# Patient Record
Sex: MALE | Race: WHITE | NOT HISPANIC OR LATINO | Employment: FULL TIME | URBAN - METROPOLITAN AREA
[De-identification: names, ages, dates, MRNs, and addresses within clinical notes are randomized per-mention and may not be internally consistent; named-entity substitution may affect disease eponyms.]

---

## 2017-09-13 ENCOUNTER — ALLSCRIPTS OFFICE VISIT (OUTPATIENT)
Dept: OTHER | Facility: OTHER | Age: 37
End: 2017-09-13

## 2018-01-12 NOTE — PROGRESS NOTES
Assessment    1  Post-nasal drip (539 91) (R09 82)    Plan  Post-nasal drip    · Nasonex 50 MCG/ACT Nasal Suspension; USE 1 SPRAY IN EACH NOSTRIL  TWICE DAILY   · Promethazine-DM 6 25-15 MG/5ML Oral Syrup; take 1 teaspoonful every 4 to 6  hours as needed for cough    Discussion/Summary    Post Nasal drip- trial of nasonex and cough syrup  Pt advised to call in 1 week with an update  Possible side effects of new medications were reviewed with the patient/guardian today  The treatment plan was reviewed with the patient/guardian  The patient/guardian understands and agrees with the treatment plan      Chief Complaint  Patient is here for chest and head congestion and cough for approximately 1 week      History of Present Illness  Pt is here for a cough at night mostly present at night  Pt has also noticed chest congestion  Pt is not a smoker  No wheezing associated with it  Has some drainage associated with it  Associated with sinus pressure  Symptoms have been going for about 1 week  Review of Systems    Constitutional: No fever or chills, feels well, no tiredness, no recent weight gain or weight loss  Eyes: No complaints of eye pain, no red eyes, no discharge from eyes, no itchy eyes  ENT: as noted in HPI  Cardiovascular: No complaints of slow heart rate, no fast heart rate, no chest pain, no palpitations, no leg claudication, no lower extremity  Respiratory: cough, but as noted in HPI  Gastrointestinal: No complaints of abdominal pain, no constipation, no nausea or vomiting, no diarrhea or bloody stools  Musculoskeletal: No complaints of arthralgia, no myalgias, no joint swelling or stiffness, no limb pain or swelling  Integumentary: No complaints of skin rash or skin lesions, no itching, no skin wound, no dry skin  ROS reviewed  Active Problems    1  Asthma (604 90) (J45 909)   2  Lipid screening (Z07 91) (O88 225)    Past Medical History    1  Bronchitis (490) (J40)   2   History of Lumbar radiculopathy (724 4) (M54 16)    The active problems and past medical history were reviewed and updated today  Surgical History    1  History of Closed Treatment Of Clavicular Fracture   2  History of Tonsillectomy With Adenoidectomy   3  History of Tympanoplasty    Family History    1  Family history of Hypothyroidism   2  Family history of Osteoarthritis (V17 7)    3  Family history of Dyslipidemia   4  Family history of Echo Mitral Valve Systolic Prolapse   5  Family history of Hypertension (V17 49)   6  Family history of Migraine Headache    7  Family history of Asthma (V17 5)    8  Family history of Cancer    Social History    · Never A Smoker   · Never Drank Alcohol   · Uses Safety Equipment - Seatbelts    Current Meds   1  No Reported Medications Recorded    Allergies    1  Codeine Derivatives   2  Hydrocortisone CREA   3  Zithromax PACK    Physical Exam    Constitutional   General appearance: No acute distress, well appearing and well nourished  Eyes   Conjunctiva and lids: No swelling, erythema, or discharge  Ears, Nose, Mouth, and Throat   External inspection of ears and nose: Normal     Otoscopic examination: Tympanic membrance translucent with normal light reflex  Canals patent without erythema  erythematous, edematous, clear drainage  Oropharynx: Normal with no erythema, edema, exudate or lesions  Pulmonary   Respiratory effort: No increased work of breathing or signs of respiratory distress  Auscultation of lungs: Clear to auscultation, equal breath sounds bilaterally, no wheezes, no rales, no rhonci  Cardiovascular   Auscultation of heart: Normal rate and rhythm, normal S1 and S2, without murmurs  Musculoskeletal   Gait and station: Normal     Skin   Skin and subcutaneous tissue: Normal without rashes or lesions           Signatures   Electronically signed by : Sunny Martinez MD; Jan 20 2016  1:56PM EST                       (Author)

## 2018-01-13 NOTE — PROGRESS NOTES
Assessment   1  Conjunctivitis (372 30) (H10 9)  2  Cough (786 2) (R05)    Plan  Cough    · Start: Benzonatate 100 MG Oral Capsule; TAKE 1 CAPSULE 3 TIMES DAILY AS NEEDED   · Start: Ciprodex 0 3-0 1 % Otic Suspension; INSTILL 3 DROPS IN AFFECTED EAR(S)  TWICE DAILY  Need for diphtheria-tetanus-pertussis (Tdap) vaccine, adult/adolescent    · Temporarily Stop: Adacel 5-2-15 5 LF-MCG/0 5 Intramuscular Suspension    Discussion/Summary    Ethan  Is here because of conjunctivitis in both eyes  I prescribed Ciprodex for his eyes  Additionally he still has an ongoing cough, the cough I prescribed last week does not seem to be helping  I've given him a prescription for generic Tessalon Perles  He will contact the office if he is not improving  Chief Complaint  Patient here for eye irritation and discharge for several days  History of Present Illness  Dusty Jimenes is here last week for an upper respiratory infection  The day following his visit he did develop some irritation in both eyes  Right eye has improved left eye seems to be getting worse with increased discharge from the left eye which was purulent in nature  He has no history of foreign body in his eye or exposure to conjunctivitis  Active Problems   1  Asthma (493 90) (J45 909)  2  Lipid screening (V77 91) (Z13 220)  3  Need for diphtheria-tetanus-pertussis (Tdap) vaccine, adult/adolescent (V06 1) (Z23)  4  Need for influenza vaccination (V04 81) (Z23)  5  Need for pneumococcal vaccination (V03 82) (Z23)  6  Negative depression screening  7  Post-nasal drip (784 91) (R09 82)    Past Medical History   1  Bronchitis (490) (J40)  2  History of Lumbar radiculopathy (724 4) (M54 16)    Surgical History   1  History of Closed Treatment Of Clavicular Fracture  2  History of Tonsillectomy With Adenoidectomy  3  History of Tympanoplasty    The surgical history was reviewed and updated today  Family History   1  Family history of Hypothyroidism  2   Family history of Osteoarthritis (V17 7)   3  Family history of Dyslipidemia  4  Family history of Echo Mitral Valve Systolic Prolapse  5  Family history of Hypertension (V17 49)  6  Family history of Migraine Headache   7  Family history of Asthma (V17 5)   8  Family history of Cancer    The family history was reviewed and updated today  Social History    · Never A Smoker   · Never Drank Alcohol   · Uses Safety Equipment - Seatbelts  The social history was reviewed and is unchanged  Current Meds  1  No Reported Medications Recorded    Allergies   1  Codeine Derivatives  2  Hydrocortisone CREA  3  Zithromax PACK    Vitals  Vital Signs [Data Includes: Current Encounter]    Recorded: [id number] 10:33AM   Temperature 98 3 F   Heart Rate 68   Systolic 229   Diastolic 82   Height 5 ft 10 5 in   Weight 159 lb    BMI Calculated 22 49   BSA Calculated 1 91   O2 Saturation 98     Physical Exam    Constitutional   General appearance: No acute distress, well appearing and well nourished  Eyes   Conjunctiva and lids: Abnormal   Conjunctiva Findings: bilateral hyperemia and purulent discharge on the left  Eye Lids: normal bilaterally  Pupils and irises: Equal, round and reactive to light  Ears, Nose, Mouth, and Throat   External inspection of ears and nose: Normal     Otoscopic examination: Tympanic membrance translucent with normal light reflex  Canals patent without erythema  Nasal mucosa, septum, and turbinates: Normal without edema or erythema  Oropharynx: Normal with no erythema, edema, exudate or lesions  Pulmonary   Respiratory effort: No increased work of breathing or signs of respiratory distress  Auscultation of lungs: Clear to auscultation, equal breath sounds bilaterally, no wheezes, no rales, no rhonci  Cardiovascular   Palpation of heart: Normal PMI, no thrills  Auscultation of heart: Normal rate and rhythm, normal S1 and S2, without murmurs      Examination of extremities for edema and/or varicosities: Normal     Carotid pulses: Normal     Abdomen   Abdomen: Non-tender, no masses  Liver and spleen: No hepatomegaly or splenomegaly  Lymphatic   Palpation of lymph nodes in neck: No lymphadenopathy  Musculoskeletal   Gait and station: Normal     Digits and nails: Normal without clubbing or cyanosis  Inspection/palpation of joints, bones, and muscles: Normal     Skin   Skin and subcutaneous tissue: Normal without rashes or lesions  Neurologic   Cranial nerves: Cranial nerves 2-12 intact  Reflexes: 2+ and symmetric  Sensation: No sensory loss      Psychiatric   Orientation to person, place and time: Normal     Mood and affect: Normal          Signatures   Electronically signed by : CARINA Hernandez ; Jan 26 2016 11:02AM EST                       (Author)    Electronically signed by : CARINA Hernandez ; Jan 26 2016 11:02AM EST                       (Author)

## 2018-01-15 VITALS
HEIGHT: 71 IN | DIASTOLIC BLOOD PRESSURE: 84 MMHG | OXYGEN SATURATION: 97 % | HEART RATE: 78 BPM | SYSTOLIC BLOOD PRESSURE: 122 MMHG | WEIGHT: 164.13 LBS | TEMPERATURE: 98.5 F | BODY MASS INDEX: 22.98 KG/M2

## 2018-01-31 ENCOUNTER — OFFICE VISIT (OUTPATIENT)
Dept: FAMILY MEDICINE CLINIC | Facility: CLINIC | Age: 38
End: 2018-01-31
Payer: COMMERCIAL

## 2018-01-31 VITALS
DIASTOLIC BLOOD PRESSURE: 98 MMHG | SYSTOLIC BLOOD PRESSURE: 158 MMHG | BODY MASS INDEX: 21.56 KG/M2 | WEIGHT: 154 LBS | HEART RATE: 61 BPM | RESPIRATION RATE: 18 BRPM | HEIGHT: 71 IN | OXYGEN SATURATION: 97 % | TEMPERATURE: 97.5 F

## 2018-01-31 DIAGNOSIS — H66.91 OTITIS MEDIA OF RIGHT EAR WITH SPONTANEOUS RUPTURE OF TYMPANIC MEMBRANE: ICD-10-CM

## 2018-01-31 DIAGNOSIS — H72.91 OTITIS MEDIA OF RIGHT EAR WITH SPONTANEOUS RUPTURE OF TYMPANIC MEMBRANE: ICD-10-CM

## 2018-01-31 DIAGNOSIS — H92.01 OTALGIA OF RIGHT EAR: Primary | ICD-10-CM

## 2018-01-31 PROCEDURE — 99214 OFFICE O/P EST MOD 30 MIN: CPT | Performed by: NURSE PRACTITIONER

## 2018-01-31 PROCEDURE — 3008F BODY MASS INDEX DOCD: CPT | Performed by: NURSE PRACTITIONER

## 2018-01-31 RX ORDER — AMOXICILLIN 875 MG/1
875 TABLET, COATED ORAL 2 TIMES DAILY
Qty: 14 TABLET | Refills: 0 | Status: SHIPPED | OUTPATIENT
Start: 2018-01-31 | End: 2018-02-07

## 2018-01-31 NOTE — PATIENT INSTRUCTIONS
For pain: use Ibuprofen every 8 hours for pain  You may apply dry heat for comfort  Return if no improvement after 7 days  Otitis Media   WHAT YOU NEED TO KNOW:   Otitis media is an ear infection  DISCHARGE INSTRUCTIONS:   Medicines:  · Ibuprofen or acetaminophen  helps decrease your pain and fever  They are available without a doctor's order  Ask your healthcare provider which medicine is right for you  Ask how much to take and how often to take it  These medicines can cause stomach bleeding if not taken correctly  Ibuprofen can cause kidney damage  Do not take ibuprofen if you have kidney disease, an ulcer, or allergies to aspirin  Acetaminophen can cause liver damage  Do not drink alcohol if you take acetaminophen  · Ear drops  help treat your ear pain  · Antibiotics  help treat a bacterial infection that caused your ear infection  · Take your medicine as directed  Contact your healthcare provider if you think your medicine is not helping or if you have side effects  Tell him or her if you are allergic to any medicine  Keep a list of the medicines, vitamins, and herbs you take  Include the amounts, and when and why you take them  Bring the list or the pill bottles to follow-up visits  Carry your medicine list with you in case of an emergency  Heat or ice:   · Heat  may be used to decrease your pain  Place a warm, moist washcloth on your ear  Apply for 15 to 20 minutes, 3 to 4 times a day    · Ice  helps decrease swelling and pain  Use an ice pack or put crushed ice in a plastic bag  Cover the ice pack with a towel and place it on your ear for 15 to 20 minutes, 3 to 4 times a day for 2 days  Prevent otitis media:   · Wash your hands often  Use soap and water  Wash your hands after you use the bathroom, change a child's diapers, or sneeze  Wash your hands before you prepare or eat food  · Stay away from people who are ill  Some germs are easily and quickly spread through contact    Return to work or school: You may return to work or school when your fever is gone  Follow up with your healthcare provider as directed:  Write down your questions so you remember to ask them during your visits  Contact your healthcare provider if:   · Your ear pain gets worse or does not go away, even after treatment  · The outside of your ear is red or swollen  · You have vomiting or diarrhea  · You have fluid coming from your ear  · You have questions or concerns about your condition or care  Return to the emergency department if:   · You have a seizure  · You have a fever and a stiff neck  © 2017 [facility] Information is for End User's use only and may not be sold, redistributed or otherwise used for commercial purposes  All illustrations and images included in CareNotes® are the copyrighted property of A D A M [employer] Inc  or Chencho Pedroza  The above information is an  only  It is not intended as medical advice for individual conditions or treatments  Talk to your doctor, nurse or pharmacist before following any medical regimen to see if it is safe and effective for you

## 2018-01-31 NOTE — PROGRESS NOTES
Assessment/Plan:    No problem-specific Assessment & Plan notes found for this encounter  Diagnoses and all orders for this visit:    Otalgia of right ear    Otitis media of right ear with spontaneous rupture of tympanic membrane  -     amoxicillin (AMOXIL) 875 mg tablet; Take 1 tablet (875 mg total) by mouth 2 (two) times a day for 7 days          Subjective:      Patient ID: Cheryl Jovel is a 44 y o  male  Pt is here to discuss pain in his right ear  The pain started a few days ago  Pain was gradual and is now intense  The pain woke him up , the first night it occurred  He has been taking tylenol  Pain originates in front of ear and radiates down the neck and he thinks his face may be swollen on the right side  The following portions of the patient's history were reviewed and updated as appropriate:   He  has no past medical history on file  He  does not have any pertinent problems on file  He  has no past surgical history on file  His family history is not on file  He  reports that he has never smoked  He has never used smokeless tobacco  He reports that he drinks alcohol  He reports that he does not use drugs  Current Outpatient Prescriptions   Medication Sig Dispense Refill    amoxicillin (AMOXIL) 875 mg tablet Take 1 tablet (875 mg total) by mouth 2 (two) times a day for 7 days 14 tablet 0     No current facility-administered medications for this visit  No current outpatient prescriptions on file prior to visit  No current facility-administered medications on file prior to visit  He is allergic to azithromycin; codeine; and hydrocortisone       Review of Systems   HENT: Positive for ear pain  Negative for congestion, rhinorrhea, sinus pain, sinus pressure and sore throat  Eyes: Negative for discharge and redness  Respiratory: Negative for cough, shortness of breath and wheezing  Cardiovascular: Negative for chest pain     Gastrointestinal: Negative for abdominal pain    Skin: Negative for color change and rash  Allergic/Immunologic: Negative for immunocompromised state  Neurological: Negative for headaches  Hematological: Positive for adenopathy  Objective:     Physical Exam   Constitutional: He is oriented to person, place, and time  He appears well-developed and well-nourished  No distress  HENT:   Head: Normocephalic and atraumatic  Right Ear: External ear normal    Left Ear: External ear normal    Nose: Nose normal    Mouth/Throat: Oropharynx is clear and moist  No oropharyngeal exudate  Right side TM rupture   Eyes: Conjunctivae and EOM are normal  Pupils are equal, round, and reactive to light  Right eye exhibits no discharge  Left eye exhibits no discharge  Neck: Normal range of motion  Neck supple  Cardiovascular: Normal rate, regular rhythm and normal heart sounds  Exam reveals no gallop and no friction rub  No murmur heard  Pulmonary/Chest: Effort normal and breath sounds normal  No respiratory distress  He has no wheezes  Abdominal: Soft  Musculoskeletal: Normal range of motion  He exhibits no edema  Lymphadenopathy:     He has cervical adenopathy  Neurological: He is alert and oriented to person, place, and time  Skin: Skin is warm and dry  No rash noted  He is not diaphoretic  No erythema  Psychiatric: He has a normal mood and affect  His behavior is normal  Judgment and thought content normal    Nursing note and vitals reviewed

## 2019-07-01 ENCOUNTER — OFFICE VISIT (OUTPATIENT)
Dept: FAMILY MEDICINE CLINIC | Facility: CLINIC | Age: 39
End: 2019-07-01
Payer: COMMERCIAL

## 2019-07-01 VITALS
SYSTOLIC BLOOD PRESSURE: 120 MMHG | HEART RATE: 70 BPM | DIASTOLIC BLOOD PRESSURE: 74 MMHG | WEIGHT: 163.8 LBS | OXYGEN SATURATION: 98 % | HEIGHT: 71 IN | RESPIRATION RATE: 18 BRPM | TEMPERATURE: 98.9 F | BODY MASS INDEX: 22.93 KG/M2

## 2019-07-01 DIAGNOSIS — M54.9 BACK PAIN, UNSPECIFIED BACK LOCATION, UNSPECIFIED BACK PAIN LATERALITY, UNSPECIFIED CHRONICITY: Primary | ICD-10-CM

## 2019-07-01 LAB
SL AMB  POCT GLUCOSE, UA: NORMAL
SL AMB LEUKOCYTE ESTERASE,UA: NORMAL
SL AMB POCT BILIRUBIN,UA: NORMAL
SL AMB POCT BLOOD,UA: NORMAL
SL AMB POCT CLARITY,UA: CLEAR
SL AMB POCT COLOR,UA: YELLOW
SL AMB POCT KETONES,UA: NORMAL
SL AMB POCT NITRITE,UA: NORMAL
SL AMB POCT PH,UA: 5.5
SL AMB POCT SPECIFIC GRAVITY,UA: 1.02
SL AMB POCT URINE PROTEIN: NORMAL
SL AMB POCT UROBILINOGEN: 0.2

## 2019-07-01 PROCEDURE — 99213 OFFICE O/P EST LOW 20 MIN: CPT | Performed by: NURSE PRACTITIONER

## 2019-07-01 PROCEDURE — 3008F BODY MASS INDEX DOCD: CPT | Performed by: NURSE PRACTITIONER

## 2019-07-01 PROCEDURE — 81003 URINALYSIS AUTO W/O SCOPE: CPT | Performed by: NURSE PRACTITIONER

## 2019-07-01 PROCEDURE — 1036F TOBACCO NON-USER: CPT | Performed by: NURSE PRACTITIONER

## 2019-07-01 RX ORDER — CYCLOBENZAPRINE HCL 5 MG
5 TABLET ORAL 3 TIMES DAILY PRN
Qty: 42 TABLET | Refills: 0 | Status: SHIPPED | OUTPATIENT
Start: 2019-07-01 | End: 2019-07-15

## 2019-07-01 NOTE — PROGRESS NOTES
Assessment/Plan:    No problem-specific Assessment & Plan notes found for this encounter  Diagnoses and all orders for this visit:    Back pain, unspecified back location, unspecified back pain laterality, unspecified chronicity  Comments: Will start 5mg Flexeril, get cbc, cmp  Follow up in 2 weeks  Orders:  -     POCT urine dip auto non-scope  -     CBC and differential; Future  -     Comprehensive metabolic panel; Future  -     cyclobenzaprine (FLEXERIL) 5 mg tablet; Take 1 tablet (5 mg total) by mouth 3 (three) times a day as needed for muscle spasms for up to 14 days          Subjective:      Patient ID: Carl Najjar is a 35 y o  male  Pt here with complaints R sided back and flank pain  This began 3-4 days ago  There is no radiation of the pain  The pain is constant  Describes the pain as dull  There are no palliative factors  It is worse with prolonged movements  There are no temporal factors  There are no reported injuries  No tick bites  No changes in sensation in legs or weakness in legs  No dyuria, hematuria  No hx of stones  The patient describes associated fatigue  This has been ongoing for 2-3 weeks  The following portions of the patient's history were reviewed and updated as appropriate:   He  has a past medical history of Lumbar radiculopathy  He   Patient Active Problem List    Diagnosis Date Noted    Back pain 07/01/2019    Otalgia of right ear 01/31/2018    Otitis media of right ear with spontaneous rupture of tympanic membrane 01/31/2018    Asthma 06/12/2013     His family history includes Asthma in his sister; Cancer in his paternal grandfather; Hypertension in his father; Hypothyroidism in his mother; Migraines in his father; Mitral valve prolapse in his father; Osteoarthritis in his mother; Other in his father    Current Outpatient Medications   Medication Sig Dispense Refill    cyclobenzaprine (FLEXERIL) 5 mg tablet Take 1 tablet (5 mg total) by mouth 3 (three) times a day as needed for muscle spasms for up to 14 days 42 tablet 0     No current facility-administered medications for this visit  He is allergic to azithromycin; codeine; and hydrocortisone       Review of Systems   Constitutional: Positive for fatigue  Negative for activity change, appetite change, chills, diaphoresis, fever and unexpected weight change  HENT: Negative for congestion, ear pain, hearing loss, postnasal drip, sinus pressure, sinus pain, sneezing and sore throat  Eyes: Negative for pain, redness and visual disturbance  Respiratory: Negative for cough and shortness of breath  Cardiovascular: Negative for chest pain and leg swelling  Gastrointestinal: Negative for abdominal pain, diarrhea, nausea and vomiting  Endocrine: Negative  Genitourinary: Negative for dysuria, frequency, hematuria, scrotal swelling and testicular pain  Musculoskeletal: Positive for back pain  Negative for arthralgias, joint swelling and neck stiffness  Skin: Negative  Neurological: Negative for dizziness and light-headedness  Psychiatric/Behavioral: Negative for behavioral problems and dysphoric mood  Objective:      /74 (BP Location: Left arm, Patient Position: Sitting, Cuff Size: Adult)   Pulse 70   Temp 98 9 °F (37 2 °C) (Tympanic)   Resp 18   Ht 5' 10 5" (1 791 m)   Wt 74 3 kg (163 lb 12 8 oz)   SpO2 98%   BMI 23 17 kg/m²          Physical Exam   Constitutional: He is oriented to person, place, and time  Vital signs are normal  He appears well-developed and well-nourished  No distress  HENT:   Head: Normocephalic and atraumatic  Eyes: Pupils are equal, round, and reactive to light  Neck: Normal range of motion  No thyromegaly present  Cardiovascular: Normal rate, regular rhythm, normal heart sounds and intact distal pulses  No murmur heard  Pulmonary/Chest: Effort normal and breath sounds normal  No respiratory distress  He has no wheezes  Abdominal: Soft  Bowel sounds are normal  He exhibits no distension  There is no tenderness  Musculoskeletal: Normal range of motion  He exhibits no tenderness or deformity  Lumbar back: He exhibits spasm  He exhibits no tenderness and no deformity  Arms:  R sided back and flank pain  Worse with bending forward  No tenderness to palpation   Neurological: He is alert and oriented to person, place, and time  No sensory deficit  He exhibits normal muscle tone  Skin: Skin is warm and dry  Capillary refill takes less than 2 seconds  Psychiatric: He has a normal mood and affect  BMI Counseling: Body mass index is 23 17 kg/m²  Discussed the patient's BMI with him  The BMI is above average  BMI counseling and education was provided to the patient  Nutrition recommendations include 3-5 servings of fruits/vegetables daily  Exercise recommendations include exercising 3-5 times per week

## 2021-03-30 DIAGNOSIS — Z23 ENCOUNTER FOR IMMUNIZATION: ICD-10-CM

## 2021-08-16 ENCOUNTER — APPOINTMENT (OUTPATIENT)
Dept: RADIOLOGY | Facility: CLINIC | Age: 41
End: 2021-08-16
Payer: COMMERCIAL

## 2021-08-16 ENCOUNTER — OFFICE VISIT (OUTPATIENT)
Dept: FAMILY MEDICINE CLINIC | Facility: CLINIC | Age: 41
End: 2021-08-16
Payer: COMMERCIAL

## 2021-08-16 VITALS
HEART RATE: 78 BPM | WEIGHT: 167 LBS | TEMPERATURE: 98.6 F | OXYGEN SATURATION: 98 % | BODY MASS INDEX: 23.91 KG/M2 | DIASTOLIC BLOOD PRESSURE: 80 MMHG | HEIGHT: 70 IN | SYSTOLIC BLOOD PRESSURE: 118 MMHG

## 2021-08-16 DIAGNOSIS — G89.29 CHRONIC RIGHT-SIDED LOW BACK PAIN WITHOUT SCIATICA: ICD-10-CM

## 2021-08-16 DIAGNOSIS — M54.50 CHRONIC RIGHT-SIDED LOW BACK PAIN WITHOUT SCIATICA: ICD-10-CM

## 2021-08-16 DIAGNOSIS — M54.50 CHRONIC RIGHT-SIDED LOW BACK PAIN WITHOUT SCIATICA: Primary | ICD-10-CM

## 2021-08-16 DIAGNOSIS — G89.29 CHRONIC RIGHT-SIDED LOW BACK PAIN WITHOUT SCIATICA: Primary | ICD-10-CM

## 2021-08-16 PROCEDURE — 1036F TOBACCO NON-USER: CPT | Performed by: PHYSICIAN ASSISTANT

## 2021-08-16 PROCEDURE — 3725F SCREEN DEPRESSION PERFORMED: CPT | Performed by: PHYSICIAN ASSISTANT

## 2021-08-16 PROCEDURE — 3008F BODY MASS INDEX DOCD: CPT | Performed by: PHYSICIAN ASSISTANT

## 2021-08-16 PROCEDURE — 72110 X-RAY EXAM L-2 SPINE 4/>VWS: CPT

## 2021-08-16 PROCEDURE — 99214 OFFICE O/P EST MOD 30 MIN: CPT | Performed by: PHYSICIAN ASSISTANT

## 2021-08-16 RX ORDER — NAPROXEN 500 MG/1
500 TABLET ORAL 2 TIMES DAILY WITH MEALS
Qty: 45 TABLET | Refills: 0 | Status: SHIPPED | OUTPATIENT
Start: 2021-08-16 | End: 2021-08-30

## 2021-08-16 RX ORDER — METAXALONE 800 MG/1
800 TABLET ORAL 3 TIMES DAILY
Qty: 45 TABLET | Refills: 0 | Status: SHIPPED | OUTPATIENT
Start: 2021-08-16 | End: 2021-09-29

## 2021-08-16 NOTE — PROGRESS NOTES
Assessment/Plan:       Problem List Items Addressed This Visit        Other    Back pain - Primary    Relevant Medications    metaxalone (SKELAXIN) 800 mg tablet    naproxen (NAPROSYN) 500 mg tablet    Other Relevant Orders    XR spine lumbar minimum 4 views non injury    Ambulatory referral to Physical Therapy        4-5 months R sided low back pain  Start naproxen, skelaxin  Update XR  PT  If no improvement consider advanced imaging and spine/pain  No red flag signs today  Subjective:      Patient ID: Leonarda Rowland is a 42 y o  male  Pt presents with concerns of R sided low back pain, present intermittently since his covid shot in April/may 2021  No injuries or truama  Was more intermittnely but now more persisting  Radiates into the gluteal region  Deep, aching pain  Worse with bending/twisting  Previously controlled with heat, stretching, now difficult to find comfortable position  Hx of L sided microdiscectomy  No LE weakness/numbness  The following portions of the patient's history were reviewed and updated as appropriate:   He  has a past medical history of Lumbar radiculopathy  He   Patient Active Problem List    Diagnosis Date Noted    Back pain 07/01/2019    Otalgia of right ear 01/31/2018    Otitis media of right ear with spontaneous rupture of tympanic membrane 01/31/2018    Asthma 06/12/2013     He  has a past surgical history that includes Fracture surgery; Tonsillectomy and adenoidectomy; and Tympanoplasty  His family history includes Asthma in his sister; Cancer in his paternal grandfather; Hypertension in his father; Hypothyroidism in his mother; Migraines in his father; Mitral valve prolapse in his father; Osteoarthritis in his mother; Other in his father  He  reports that he has never smoked  He has never used smokeless tobacco  He reports current alcohol use  He reports that he does not use drugs    Current Outpatient Medications   Medication Sig Dispense Refill    cyclobenzaprine (FLEXERIL) 5 mg tablet Take 1 tablet (5 mg total) by mouth 3 (three) times a day as needed for muscle spasms for up to 14 days 42 tablet 0    metaxalone (SKELAXIN) 800 mg tablet Take 1 tablet (800 mg total) by mouth 3 (three) times a day 45 tablet 0    naproxen (NAPROSYN) 500 mg tablet Take 1 tablet (500 mg total) by mouth 2 (two) times a day with meals 45 tablet 0     No current facility-administered medications for this visit  Current Outpatient Medications on File Prior to Visit   Medication Sig    cyclobenzaprine (FLEXERIL) 5 mg tablet Take 1 tablet (5 mg total) by mouth 3 (three) times a day as needed for muscle spasms for up to 14 days     No current facility-administered medications on file prior to visit  He is allergic to azithromycin, codeine, and hydrocortisone       Review of Systems   Constitutional: Negative  Genitourinary: Negative  Musculoskeletal: Positive for back pain  Skin: Negative  Neurological: Negative for weakness and numbness  Objective:      /80 (BP Location: Left arm, Patient Position: Sitting, Cuff Size: Standard)   Pulse 78   Temp 98 6 °F (37 °C)   Ht 5' 10" (1 778 m)   Wt 75 8 kg (167 lb)   SpO2 98%   BMI 23 96 kg/m²          Physical Exam  Vitals and nursing note reviewed  Constitutional:       General: He is not in acute distress  Appearance: Normal appearance  He is not ill-appearing  HENT:      Head: Normocephalic and atraumatic  Nose: Nose normal    Eyes:      Conjunctiva/sclera: Conjunctivae normal    Pulmonary:      Effort: Pulmonary effort is normal  No respiratory distress  Musculoskeletal:      Cervical back: Normal range of motion  Lumbar back: Spasms and tenderness present  No bony tenderness  Decreased range of motion  Positive right straight leg raise test  Negative left straight leg raise test    Skin:     General: Skin is warm and dry  Coloration: Skin is not pale        Findings: No bruising, erythema or rash  Neurological:      Mental Status: He is alert and oriented to person, place, and time  Sensory: No sensory deficit  Motor: No weakness  Gait: Abnormal gait: antalgic gait     Psychiatric:         Mood and Affect: Mood normal

## 2021-08-30 DIAGNOSIS — G89.29 CHRONIC RIGHT-SIDED LOW BACK PAIN WITHOUT SCIATICA: ICD-10-CM

## 2021-08-30 DIAGNOSIS — M54.50 CHRONIC RIGHT-SIDED LOW BACK PAIN WITHOUT SCIATICA: ICD-10-CM

## 2021-08-30 RX ORDER — NAPROXEN 500 MG/1
TABLET ORAL
Qty: 45 TABLET | Refills: 0 | Status: SHIPPED | OUTPATIENT
Start: 2021-08-30 | End: 2021-09-23

## 2021-09-23 DIAGNOSIS — M54.50 CHRONIC RIGHT-SIDED LOW BACK PAIN WITHOUT SCIATICA: ICD-10-CM

## 2021-09-23 DIAGNOSIS — G89.29 CHRONIC RIGHT-SIDED LOW BACK PAIN WITHOUT SCIATICA: ICD-10-CM

## 2021-09-23 RX ORDER — NAPROXEN 500 MG/1
TABLET ORAL
Qty: 45 TABLET | Refills: 0 | Status: SHIPPED | OUTPATIENT
Start: 2021-09-23 | End: 2021-11-05 | Stop reason: SDUPTHER

## 2021-09-29 ENCOUNTER — OFFICE VISIT (OUTPATIENT)
Dept: FAMILY MEDICINE CLINIC | Facility: CLINIC | Age: 41
End: 2021-09-29
Payer: COMMERCIAL

## 2021-09-29 VITALS
DIASTOLIC BLOOD PRESSURE: 84 MMHG | SYSTOLIC BLOOD PRESSURE: 122 MMHG | HEART RATE: 91 BPM | HEIGHT: 70 IN | BODY MASS INDEX: 24.37 KG/M2 | TEMPERATURE: 97.8 F | OXYGEN SATURATION: 98 % | WEIGHT: 170.2 LBS

## 2021-09-29 DIAGNOSIS — M54.16 LUMBAR BACK PAIN WITH RADICULOPATHY AFFECTING RIGHT LOWER EXTREMITY: Primary | ICD-10-CM

## 2021-09-29 DIAGNOSIS — M54.16 LUMBAR BACK PAIN WITH RADICULOPATHY AFFECTING RIGHT LOWER EXTREMITY: ICD-10-CM

## 2021-09-29 PROBLEM — H92.01 OTALGIA OF RIGHT EAR: Status: RESOLVED | Noted: 2018-01-31 | Resolved: 2021-09-29

## 2021-09-29 PROBLEM — H66.91 OTITIS MEDIA OF RIGHT EAR WITH SPONTANEOUS RUPTURE OF TYMPANIC MEMBRANE: Status: RESOLVED | Noted: 2018-01-31 | Resolved: 2021-09-29

## 2021-09-29 PROBLEM — H72.91 OTITIS MEDIA OF RIGHT EAR WITH SPONTANEOUS RUPTURE OF TYMPANIC MEMBRANE: Status: RESOLVED | Noted: 2018-01-31 | Resolved: 2021-09-29

## 2021-09-29 PROCEDURE — 99213 OFFICE O/P EST LOW 20 MIN: CPT | Performed by: FAMILY MEDICINE

## 2021-09-29 PROCEDURE — 3008F BODY MASS INDEX DOCD: CPT | Performed by: FAMILY MEDICINE

## 2021-09-29 PROCEDURE — 1036F TOBACCO NON-USER: CPT | Performed by: FAMILY MEDICINE

## 2021-09-29 RX ORDER — METHYLPREDNISOLONE 4 MG/1
TABLET ORAL
Qty: 1 EACH | Refills: 0 | Status: SHIPPED | OUTPATIENT
Start: 2021-09-29 | End: 2021-09-29 | Stop reason: SDUPTHER

## 2021-09-29 RX ORDER — METHYLPREDNISOLONE 4 MG/1
TABLET ORAL
Qty: 1 EACH | Refills: 0 | Status: SHIPPED | OUTPATIENT
Start: 2021-09-29 | End: 2021-11-05 | Stop reason: SDUPTHER

## 2021-09-29 NOTE — PROGRESS NOTES
Kelsey Ronquillo 1980 male MRN: [id number]    Acute Visit        ASSESSMENT/PLAN  Problem List Items Addressed This Visit     None      Visit Diagnoses     Lumbar back pain with radiculopathy affecting right lower extremity    -  Primary    Relevant Medications    methylPREDNISolone 4 MG tablet therapy pack          Start Medrol, continue PT  If not improving consider MRI/spine and pain referral      No future appointments  SUBJECTIVE  CC: Back Pain (lower)         40-year-old male here for back pain for several months  He was seen in our office in August for similar complaints  He was started on naproxen and Skelaxin  He had an x-ray which showed mild degenerative changes/spondylosis  He has been doing PT for about 4 weeks  He has radiation down to the foot  His PT recommended possibly trying steroid  He has been taking Naproxen once a day  Back feels tight, has limited ROM  Pain does not seem to follow any certain patterns or triggers  Pain has improved but still having nagging pain  Kelsey Ronquillo is a 42 y o  male who presented for an acute visit complaining of  Review of Systems   Constitutional: Negative  HENT: Negative  Respiratory: Negative  Cardiovascular: Negative  Gastrointestinal: Negative  Musculoskeletal: Positive for back pain  Neurological: Positive for numbness  Negative for weakness         Historical Information   The patient history was reviewed as follows:  Past Medical History:   Diagnosis Date    Lumbar radiculopathy     Last Assessed:5/7/13     Past Surgical History:   Procedure Laterality Date    FRACTURE SURGERY      Closed Fracture Treatment of Clavicular Fracture    TONSILLECTOMY AND ADENOIDECTOMY      TYMPANOPLASTY       Family History   Problem Relation Age of Onset    Asthma Sister     Hypothyroidism Mother     Osteoarthritis Mother     Other Father         Dyslipidemia    Mitral valve prolapse Father     Hypertension Father     Migraines Father     Cancer Paternal Grandfather       Social History   Social History     Substance and Sexual Activity   Alcohol Use Yes    Comment: rare/ Per [employer]: never drank     Social History     Substance and Sexual Activity   Drug Use No     Social History     Tobacco Use   Smoking Status Never Smoker   Smokeless Tobacco Never Used       Medications:   Meds/Allergies   Current Outpatient Medications   Medication Sig Dispense Refill    naproxen (NAPROSYN) 500 mg tablet TAKE 1 TABLET BY MOUTH TWICE A DAY WITH MEALS 45 tablet 0    cyclobenzaprine (FLEXERIL) 5 mg tablet Take 1 tablet (5 mg total) by mouth 3 (three) times a day as needed for muscle spasms for up to 14 days 42 tablet 0    methylPREDNISolone 4 MG tablet therapy pack Use as directed on package 1 each 0     No current facility-administered medications for this visit  Allergies   Allergen Reactions    Azithromycin     Codeine Tachycardia     Category: Adverse Reaction;     Hydrocortisone Rash     Category: Adverse Reaction;        OBJECTIVE  Vitals:   Vitals:    09/29/21 1313   BP: 122/84   BP Location: Right arm   Patient Position: Sitting   Pulse: 91   Temp: 97 8 °F (36 6 °C)   TempSrc: Temporal   SpO2: 98%   Weight: 77 2 kg (170 lb 3 2 oz)   Height: 5' 10" (1 778 m)       Invasive Devices     None                 Physical Exam  Vitals and nursing note reviewed  Constitutional:       Appearance: Normal appearance  He is normal weight  Pulmonary:      Effort: Pulmonary effort is normal  No respiratory distress  Musculoskeletal:      Lumbar back: Spasms and tenderness present  No swelling, deformity or lacerations  Positive right straight leg raise test    Neurological:      Mental Status: He is alert  Lab:  I have personally reviewed all pertinent results

## 2021-09-29 NOTE — PATIENT INSTRUCTIONS
Lower Back Exercises   AMBULATORY CARE:   Lower back exercises  help heal and strengthen your back muscles to prevent another injury  Ask your healthcare provider if you need to see a physical therapist for more advanced exercises  Seek care immediately if:   · You have severe pain that prevents you from moving  Contact your healthcare provider if:   · Your pain becomes worse  · You have new pain  · You have questions or concerns about your condition or care  Do lower back exercises safely:   · Do the exercises on a mat or firm surface  (not on a bed) to support your spine and prevent low back pain  · Move slowly and smoothly  Avoid fast or jerky motions  · Breathe normally  Do not hold your breath  · Stop if you feel pain  It is normal to feel some discomfort at first  Regular exercise will help decrease your discomfort over time  Lower back exercises: Your healthcare provider may recommend that you do back exercises 10 to 30 minutes each day  He may also recommend that you do exercises 1 to 3 times each day  Ask your healthcare provider which exercises are best for you and how often to do them  · Ankle pumps:  Lie on your back  Move your foot up (with your toes pointing toward your head)  Then, move your foot down (with your toes pointing away from you)  Repeat this exercise 10 times on each side  · Heel slides:  Lie on your back  Slowly bend one leg and then straighten it  Next, bend the other leg and then straighten it  Repeat 10 times on each side  · Pelvic tilt:  Lie on your back with your knees bent and feet flat on the floor  Place your arms in a relaxed position beside your body  Tighten the muscles of your abdomen and flatten your back against the floor  Hold for 5 seconds  Repeat 5 times  · Back stretch:  Lie on your back with your hands behind your head  Bend your knees and turn the lower half of your body to one side   Hold this position for 10 seconds  Repeat 3 times on each side  · Straight leg raises:  Lie on your back with one leg straight  Bend the other knee  Tighten your abdomen and then slowly lift the straight leg up about 6 to 12 inches off the floor  Hold for 1 to 5 seconds  Lower your leg slowly  Repeat 10 times on each leg  · Knee-to-chest:  Lie on your back with your knees bent and feet flat on the floor  Pull one of your knees toward your chest and hold it there for 5 seconds  Return your leg to the starting position  Lift the other knee toward your chest and hold for 5 seconds  Do this 5 times on each side  · Cat and camel:  Place your hands and knees on the floor  Arch your back upward toward the ceiling and lower your head  Round out your spine as much as you can  Hold for 5 seconds  Lift your head upward and push your chest downward toward the floor  Hold for 5 seconds  Do 3 sets or as directed  · Wall squats:  Stand with your back against a wall  Tighten the muscles of your abdomen  Slowly lower your body until your knees are bent at a 45 degree angle  Hold this position for 5 seconds  Slowly move back up to a standing position  Repeat 10 times  · Curl up:  Lie on your back with your knees bent and feet flat on the floor  Place your hands, palms down, underneath the curve in your lower back  Next, with your elbows on the floor, lift your shoulders and chest 2 to 3 inches  Keep your head in line with your shoulders  Hold this position for 5 seconds  When you can do this exercise without pain for 10 to 15 seconds, you may add a rotation  While your shoulders and chest are lifted off the ground, turn slightly to the left and hold  Repeat on the other side  · Bird dog:  Place your hands and knees on the floor  Keep your wrists directly below your shoulders and your knees directly below your hips  Pull your belly button in toward your spine  Do not flatten or arch your back   Tighten your abdominal muscles  Raise one arm straight out so that it is aligned with your head  Next, raise the leg opposite your arm  Hold this position for 15 seconds  Lower your arm and leg slowly and change sides  Do 5 sets  © Copyright [employer] 2021 Information is for End User's use only and may not be sold, redistributed or otherwise used for commercial purposes  All illustrations and images included in CareNotes® are the copyrighted property of A D A M , Inc  or [facility] Curtis Romano   The above information is an  only  It is not intended as medical advice for individual conditions or treatments  Talk to your doctor, nurse or pharmacist before following any medical regimen to see if it is safe and effective for you

## 2021-11-05 DIAGNOSIS — M54.16 LUMBAR BACK PAIN WITH RADICULOPATHY AFFECTING RIGHT LOWER EXTREMITY: ICD-10-CM

## 2021-11-05 RX ORDER — METHYLPREDNISOLONE 4 MG/1
TABLET ORAL
Qty: 1 EACH | Refills: 0 | Status: SHIPPED | OUTPATIENT
Start: 2021-11-05

## 2022-06-19 RX ORDER — TADALAFIL 10 MG/1
TABLET ORAL
COMMUNITY
Start: 2022-04-02

## 2022-06-20 ENCOUNTER — OFFICE VISIT (OUTPATIENT)
Dept: FAMILY MEDICINE CLINIC | Facility: CLINIC | Age: 42
End: 2022-06-20
Payer: COMMERCIAL

## 2022-06-20 VITALS
WEIGHT: 163.2 LBS | HEIGHT: 70 IN | DIASTOLIC BLOOD PRESSURE: 76 MMHG | OXYGEN SATURATION: 99 % | HEART RATE: 67 BPM | SYSTOLIC BLOOD PRESSURE: 124 MMHG | BODY MASS INDEX: 23.37 KG/M2 | TEMPERATURE: 97.8 F

## 2022-06-20 DIAGNOSIS — M54.50 CHRONIC RIGHT-SIDED LOW BACK PAIN WITHOUT SCIATICA: ICD-10-CM

## 2022-06-20 DIAGNOSIS — M54.41 ACUTE RIGHT-SIDED LOW BACK PAIN WITH RIGHT-SIDED SCIATICA: Primary | ICD-10-CM

## 2022-06-20 DIAGNOSIS — G89.29 CHRONIC RIGHT-SIDED LOW BACK PAIN WITHOUT SCIATICA: ICD-10-CM

## 2022-06-20 PROCEDURE — 99214 OFFICE O/P EST MOD 30 MIN: CPT | Performed by: FAMILY MEDICINE

## 2022-06-20 PROCEDURE — 1036F TOBACCO NON-USER: CPT | Performed by: FAMILY MEDICINE

## 2022-06-20 PROCEDURE — 3008F BODY MASS INDEX DOCD: CPT | Performed by: FAMILY MEDICINE

## 2022-06-20 RX ORDER — METAXALONE 800 MG/1
800 TABLET ORAL 3 TIMES DAILY
Qty: 90 TABLET | Refills: 1 | Status: SHIPPED | OUTPATIENT
Start: 2022-06-20

## 2022-06-20 RX ORDER — TRAMADOL HYDROCHLORIDE 50 MG/1
50 TABLET ORAL
Qty: 10 TABLET | Refills: 0 | Status: SHIPPED | OUTPATIENT
Start: 2022-06-20

## 2022-06-20 RX ORDER — NAPROXEN 250 MG/1
250 TABLET ORAL 2 TIMES DAILY PRN
Qty: 60 TABLET | Refills: 1 | Status: SHIPPED | OUTPATIENT
Start: 2022-06-20

## 2022-06-20 NOTE — PROGRESS NOTES
Dara Gallo 1980 male MRN: [id number]      ASSESSMENT/PLAN  Problem List Items Addressed This Visit        Other    Back pain - Primary    Relevant Medications    metaxalone (SKELAXIN) 800 mg tablet    naproxen (NAPROSYN) 250 mg tablet    traMADol (Ultram) 50 mg tablet    Other Relevant Orders    MRI lumbar spine wo contrast    Ambulatory Referral to Pain Management        Acute worsening of chronic back pain  Exam demonstrates weakness/numbness in RLE and decreased sensation  Concern for disc protrusion with nerve impingement  Will get L-spine MRI, refer to Spine & Pain  Continue supportive care with NSAID, muscle relaxant  Will provide short course of Tramadol for sleep  No future appointments  SUBJECTIVE  CC: Back Pain      HPI:  Dara Gallo is a 36 y o  male who presents due to back pain  Injured his back last summer -- completed course of PT and has been managing with Skelexin and Naproxen     6/17 -- tried to  something (mid 20 pounds) and felt a "pop"  Has numbness/tingling in RLE (top/outside of foot and toes) and burning down the leg   Has been taking prednisone taper that he had at home and skelexin; was using ice on and off   Can walk around a little bit -- sitting makes the symptoms worse   No GI/ concerns     Review of Systems   Gastrointestinal:        Denies stool incontinence/retention    Genitourinary:        Denies urinary incontinence/retention    Musculoskeletal: Positive for back pain  Neurological: Positive for weakness and numbness         Historical Information   The patient history was reviewed and updated as follows:    Past Medical History:   Diagnosis Date    Lumbar radiculopathy     Last Assessed:5/7/13     Past Surgical History:   Procedure Laterality Date    FRACTURE SURGERY      Closed Fracture Treatment of Clavicular Fracture    TONSILLECTOMY AND ADENOIDECTOMY      TYMPANOPLASTY       Family History   Problem Relation Age of Onset    Asthma Sister     Hypothyroidism Mother     Osteoarthritis Mother     Other Father         Dyslipidemia    Mitral valve prolapse Father     Hypertension Father     Migraines Father     Cancer Paternal Grandfather       Social History   Social History     Substance and Sexual Activity   Alcohol Use Yes    Comment: rare/ Per [employer]: never drank     Social History     Substance and Sexual Activity   Drug Use No     Social History     Tobacco Use   Smoking Status Never Smoker   Smokeless Tobacco Never Used       Medications:     Current Outpatient Medications:     metaxalone (SKELAXIN) 800 mg tablet, Take 1 tablet (800 mg total) by mouth 3 (three) times a day, Disp: 90 tablet, Rfl: 1    methylPREDNISolone 4 MG tablet therapy pack, Use as directed on package, Disp: 1 each, Rfl: 0    naproxen (NAPROSYN) 250 mg tablet, Take 1 tablet (250 mg total) by mouth 2 (two) times a day as needed (back pain), Disp: 60 tablet, Rfl: 1    tadalafil (CIALIS) 10 MG tablet, TAKE 1 TABLET BY MOUTH DAILY AS NEEDED ONE HOUR BEFORE SEX, NOT MORE THAN ONCE PER DAY (ED), Disp: , Rfl:     traMADol (Ultram) 50 mg tablet, Take 1 tablet (50 mg total) by mouth daily at bedtime as needed for severe pain, Disp: 10 tablet, Rfl: 0    cyclobenzaprine (FLEXERIL) 5 mg tablet, Take 1 tablet (5 mg total) by mouth 3 (three) times a day as needed for muscle spasms for up to 14 days, Disp: 42 tablet, Rfl: 0  Allergies   Allergen Reactions    Azithromycin     Codeine Tachycardia     Category: Adverse Reaction;     Hydrocortisone Rash     Category: Adverse Reaction;        OBJECTIVE    Vitals:   Vitals:    06/20/22 1139   BP: 124/76   BP Location: Left arm   Patient Position: Sitting   Pulse: 67   Temp: 97 8 °F (36 6 °C)   TempSrc: Temporal   SpO2: 99%   Weight: 74 kg (163 lb 3 2 oz)   Height: 5' 10" (1 778 m)           Physical Exam  Vitals and nursing note reviewed  Constitutional:       General: He is not in acute distress       Appearance: Normal appearance  HENT:      Head: Normocephalic and atraumatic  Pulmonary:      Effort: Pulmonary effort is normal  No respiratory distress  Musculoskeletal:        Back:       Comments: Mildly tender to palpation over low L-spine; tender over R paraspinals   RLE strength decreased compared to LLE  Decreased subjective sensation over R outer foot and R toes   Significant discomfort while in seated position    Neurological:      General: No focal deficit present  Mental Status: He is alert     Psychiatric:         Mood and Affect: Mood normal                     DO Ari Gilmore [address] [facility]   6/20/2022  12:07 PM

## 2022-06-24 ENCOUNTER — HOSPITAL ENCOUNTER (OUTPATIENT)
Dept: MRI IMAGING | Facility: HOSPITAL | Age: 42
Discharge: HOME | End: 2022-06-24
Payer: COMMERCIAL

## 2022-06-24 DIAGNOSIS — M54.41 ACUTE RIGHT-SIDED LOW BACK PAIN WITH RIGHT-SIDED SCIATICA: ICD-10-CM

## 2022-06-24 PROCEDURE — 72148 MRI LUMBAR SPINE W/O DYE: CPT

## 2022-06-24 PROCEDURE — G1004 CDSM NDSC: HCPCS

## 2023-02-15 ENCOUNTER — OFFICE VISIT (OUTPATIENT)
Dept: URGENT CARE | Facility: CLINIC | Age: 43
End: 2023-02-15

## 2023-02-15 ENCOUNTER — APPOINTMENT (OUTPATIENT)
Dept: RADIOLOGY | Facility: CLINIC | Age: 43
End: 2023-02-15

## 2023-02-15 VITALS
OXYGEN SATURATION: 97 % | SYSTOLIC BLOOD PRESSURE: 126 MMHG | TEMPERATURE: 97.4 F | HEART RATE: 57 BPM | DIASTOLIC BLOOD PRESSURE: 88 MMHG | RESPIRATION RATE: 18 BRPM

## 2023-02-15 DIAGNOSIS — M79.671 RIGHT FOOT PAIN: ICD-10-CM

## 2023-02-15 DIAGNOSIS — M79.671 RIGHT FOOT PAIN: Primary | ICD-10-CM

## 2023-02-15 NOTE — PROGRESS NOTES
[facility]        NAME: Cora Caldwell is a 36 y o  male  : 1980    MRN: [id number]  DATE: February 15, 2023  TIME: 12:47 PM    Assessment and Plan   Right foot pain [M79 671]  1  Right foot pain  XR foot 3+ vw right        Xray appears negative for any fracture  Will follow up with radiologist report when available  Offered to place in surgical shoe, but declined  Patient Instructions     Check my chart for final radiology results  Buddy tape as needed  Tylenol/motrin as needed for pain  Ice/elevate  Follow up with PCP in 3-5 days  Proceed to the ER with worsening symptoms  Chief Complaint     Chief Complaint   Patient presents with   • Toe Pain     Right 2 toes pain started last night smashed it when he ran into something while playing with kids         History of Present Illness       The patient presents today with complaints of R 4th and 5th toe pain that started yesterday  He states that he was running after his kids when he hit his foot off a cabinet  He states the pain has improved slightly but will get a shooting pain that radiates up into his lower leg  He denies any history of previous fracture/surgery  He denies any other injury  Review of Systems   Review of Systems   Eyes: Negative  Musculoskeletal: Positive for arthralgias (R foot- 4th and 5th toes) and gait problem (R foot)  Negative for joint swelling  Skin: Negative for color change, rash and wound  Psychiatric/Behavioral: Negative            Current Medications       Current Outpatient Medications:   •  metaxalone (SKELAXIN) 800 mg tablet, Take 1 tablet (800 mg total) by mouth 3 (three) times a day, Disp: 90 tablet, Rfl: 1  •  methylPREDNISolone 4 MG tablet therapy pack, Use as directed on package, Disp: 1 each, Rfl: 0  •  naproxen (NAPROSYN) 250 mg tablet, Take 1 tablet (250 mg total) by mouth 2 (two) times a day as needed (back pain), Disp: 60 tablet, Rfl: 1  •  tadalafil (CIALIS) 10 MG tablet, TAKE 1 TABLET BY MOUTH DAILY AS NEEDED ONE HOUR BEFORE SEX, NOT MORE THAN ONCE PER DAY (ED), Disp: , Rfl:   •  cyclobenzaprine (FLEXERIL) 5 mg tablet, Take 1 tablet (5 mg total) by mouth 3 (three) times a day as needed for muscle spasms for up to 14 days, Disp: 42 tablet, Rfl: 0  •  traMADol (Ultram) 50 mg tablet, Take 1 tablet (50 mg total) by mouth daily at bedtime as needed for severe pain, Disp: 10 tablet, Rfl: 0    Current Allergies     Allergies as of 02/15/2023 - Reviewed 02/15/2023   Allergen Reaction Noted   • Azithromycin  06/12/2013   • Codeine Tachycardia 06/12/2013   • Hydrocortisone Rash 06/12/2013            The following portions of the patient's history were reviewed and updated as appropriate: allergies, current medications, past family history, past medical history, past social history, past surgical history and problem list      Past Medical History:   Diagnosis Date   • Lumbar radiculopathy     Last Assessed:5/7/13       Past Surgical History:   Procedure Laterality Date   • FRACTURE SURGERY      Closed Fracture Treatment of Clavicular Fracture   • TONSILLECTOMY AND ADENOIDECTOMY     • TYMPANOPLASTY         Family History   Problem Relation Age of Onset   • Asthma Sister    • Hypothyroidism Mother    • Osteoarthritis Mother    • Other Father         Dyslipidemia   • Mitral valve prolapse Father    • Hypertension Father    • Migraines Father    • Cancer Paternal Grandfather          Medications have been verified  Objective   /88   Pulse 57   Temp (!) 97 4 °F (36 3 °C)   Resp 18   SpO2 97%        Physical Exam     Physical Exam  Vitals and nursing note reviewed  Constitutional:       General: He is not in acute distress  Appearance: Normal appearance  He is not ill-appearing  HENT:      Head: Normocephalic and atraumatic  Right Ear: External ear normal       Left Ear: External ear normal       Nose: Nose normal       Mouth/Throat:      Lips: Pink        Mouth: Mucous membranes are moist    Eyes:      General: Vision grossly intact  Extraocular Movements: Extraocular movements intact  Pupils: Pupils are equal, round, and reactive to light  Cardiovascular:      Rate and Rhythm: Normal rate  Pulmonary:      Effort: Pulmonary effort is normal    Musculoskeletal:         General: Normal range of motion  Cervical back: Normal range of motion  Feet:    Skin:     General: Skin is warm  Findings: No rash  Neurological:      Mental Status: He is alert and oriented to person, place, and time  Motor: Motor function is intact     Psychiatric:         Attention and Perception: Attention normal          Mood and Affect: Mood normal

## 2023-02-15 NOTE — PATIENT INSTRUCTIONS
Check my chart for final radiology results  Buddy tape as needed  Tylenol/motrin as needed for pain  Ice/elevate  Follow up with PCP in 3-5 days  Proceed to the ER with worsening symptoms

## 2023-06-05 ENCOUNTER — OFFICE VISIT (OUTPATIENT)
Dept: FAMILY MEDICINE CLINIC | Facility: CLINIC | Age: 43
End: 2023-06-05
Payer: COMMERCIAL

## 2023-06-05 VITALS
BODY MASS INDEX: 22.48 KG/M2 | HEART RATE: 84 BPM | OXYGEN SATURATION: 97 % | TEMPERATURE: 98.2 F | HEIGHT: 70 IN | WEIGHT: 157 LBS | SYSTOLIC BLOOD PRESSURE: 130 MMHG | DIASTOLIC BLOOD PRESSURE: 80 MMHG

## 2023-06-05 DIAGNOSIS — Z91.09 ENVIRONMENTAL ALLERGIES: Primary | ICD-10-CM

## 2023-06-05 DIAGNOSIS — L98.9 NON-HEALING SKIN LESION OF NOSE: ICD-10-CM

## 2023-06-05 PROCEDURE — 99214 OFFICE O/P EST MOD 30 MIN: CPT | Performed by: FAMILY MEDICINE

## 2023-06-05 RX ORDER — FLUTICASONE PROPIONATE 50 MCG
1 SPRAY, SUSPENSION (ML) NASAL DAILY
Qty: 16 G | Refills: 2 | Status: SHIPPED | OUTPATIENT
Start: 2023-06-05

## 2023-06-05 NOTE — PROGRESS NOTES
Assessment/Plan:         Problem List Items Addressed This Visit        Musculoskeletal and Integument    Non-healing skin lesion of nose     Would recommend derm evaluation - referral given         Relevant Orders    Ambulatory Referral to Dermatology       Other    Environmental allergies - Primary     Claritin d and Flonase         Relevant Medications    loratadine-pseudoephedrine (CLARITIN-D 24-HOUR)  mg per 24 hr tablet    fluticasone (FLONASE) 50 mcg/act nasal spray         Subjective:      Patient ID: Heriberto Egan is a 39 y o  male  He is here for allergy symptoms, congestion, rhinorrhea, headaches, post nasal drip  Started several months ago  Tried OTC allergy medications  Flonase seemed to help  He also has a spot on his nose that bleeds on and off  Has been there for several weeks  The following portions of the patient's history were reviewed and updated as appropriate:   Past Medical History:  He has a past medical history of Lumbar radiculopathy  ,  _______________________________________________________________________  Medical Problems:  does not have any pertinent problems on file ,  _______________________________________________________________________  Past Surgical History:   has a past surgical history that includes Fracture surgery; Tonsillectomy and adenoidectomy; and Tympanoplasty  ,  _______________________________________________________________________  Family History:  family history includes Asthma in his sister; Cancer in his paternal grandfather; Hypertension in his father; Hypothyroidism in his mother; Migraines in his father; Mitral valve prolapse in his father; Osteoarthritis in his mother; Other in his father ,  _______________________________________________________________________  Social History:   reports that he has never smoked  He has never used smokeless tobacco  He reports current alcohol use   He reports that he does not use "drugs  ,  _______________________________________________________________________  Allergies:  is allergic to azithromycin, codeine, and hydrocortisone     _______________________________________________________________________  Current Outpatient Medications   Medication Sig Dispense Refill   • fluticasone (FLONASE) 50 mcg/act nasal spray 1 spray into each nostril daily 16 g 2   • loratadine-pseudoephedrine (CLARITIN-D 24-HOUR)  mg per 24 hr tablet Take 1 tablet by mouth daily 30 tablet 2     No current facility-administered medications for this visit      _______________________________________________________________________  Review of Systems   Constitutional: Negative for chills and fever  HENT: Positive for congestion, postnasal drip and sore throat (scratchy)  Respiratory: Positive for cough  Allergic/Immunologic: Positive for environmental allergies  Objective:  Vitals:    06/05/23 1558   BP: 130/80   Pulse: 84   Temp: 98 2 °F (36 8 °C)   SpO2: 97%   Weight: 71 2 kg (157 lb)   Height: 5' 10\" (1 778 m)     Body mass index is 22 53 kg/m²  Physical Exam  Vitals and nursing note reviewed  Constitutional:       General: He is not in acute distress  Appearance: He is well-developed  He is not diaphoretic  HENT:      Head: Normocephalic and atraumatic  Right Ear: Hearing, tympanic membrane, ear canal and external ear normal       Left Ear: Hearing, tympanic membrane, ear canal and external ear normal       Nose: Nose normal         Mouth/Throat:      Pharynx: Uvula midline  No oropharyngeal exudate or posterior oropharyngeal erythema  Comments: Clear PND  Eyes:      Conjunctiva/sclera: Conjunctivae normal       Pupils: Pupils are equal, round, and reactive to light  Cardiovascular:      Rate and Rhythm: Normal rate  Heart sounds: Normal heart sounds  No murmur heard  No friction rub  No gallop     Pulmonary:      Effort: Pulmonary effort is normal  No respiratory " distress  Breath sounds: Normal breath sounds  No wheezing or rales  Lymphadenopathy:      Cervical: No cervical adenopathy  Skin:     General: Skin is warm  Findings: No rash  Neurological:      Mental Status: He is alert and oriented to person, place, and time  Psychiatric:         Behavior: Behavior normal          Thought Content:  Thought content normal          Judgment: Judgment normal

## 2024-09-17 ENCOUNTER — OFFICE VISIT (OUTPATIENT)
Dept: URGENT CARE | Facility: CLINIC | Age: 44
End: 2024-09-17
Payer: COMMERCIAL

## 2024-09-17 VITALS
SYSTOLIC BLOOD PRESSURE: 125 MMHG | TEMPERATURE: 97.9 F | HEART RATE: 68 BPM | OXYGEN SATURATION: 98 % | RESPIRATION RATE: 18 BRPM | DIASTOLIC BLOOD PRESSURE: 85 MMHG

## 2024-09-17 DIAGNOSIS — B02.9 HERPES ZOSTER WITHOUT COMPLICATION: Primary | ICD-10-CM

## 2024-09-17 PROCEDURE — S9088 SERVICES PROVIDED IN URGENT: HCPCS | Performed by: PHYSICAL MEDICINE & REHABILITATION

## 2024-09-17 PROCEDURE — 99213 OFFICE O/P EST LOW 20 MIN: CPT | Performed by: PHYSICAL MEDICINE & REHABILITATION

## 2024-09-17 RX ORDER — VALACYCLOVIR HYDROCHLORIDE 1 G/1
1000 TABLET, FILM COATED ORAL 2 TIMES DAILY
Qty: 14 TABLET | Refills: 0 | Status: SHIPPED | OUTPATIENT
Start: 2024-09-17 | End: 2024-09-24

## 2024-09-17 NOTE — PROGRESS NOTES
[facility]        NAME: Ethan Martell is a 44 y.o. male  : 1980    MRN: [id number]  DATE: 2024  TIME: 11:53 AM    Assessment and Plan   Herpes zoster without complication [B02.9]  1. Herpes zoster without complication  valACYclovir (VALTREX) 1,000 mg tablet            Patient Instructions       Follow up with PCP in 3-5 days.  Proceed to  ER if symptoms worsen.    If tests are performed, our office will contact you with results only if changes need to made to the care plan discussed with you at the visit. You can review your full results on [facility].    Chief Complaint     Chief Complaint   Patient presents with    Herpes Zoster     Rash vs shingles note 3 days ago          History of Present Illness       Patient presenting with a rash to the anterior chest wall that started on 24.      Review of Systems   Review of Systems   Constitutional: Negative.    Respiratory: Negative.     Cardiovascular: Negative.    Skin:  Positive for rash.         Current Medications       Current Outpatient Medications:     valACYclovir (VALTREX) 1,000 mg tablet, Take 1 tablet (1,000 mg total) by mouth 2 (two) times a day for 7 days, Disp: 14 tablet, Rfl: 0    fluticasone (FLONASE) 50 mcg/act nasal spray, 1 spray into each nostril daily (Patient not taking: Reported on 2024), Disp: 16 g, Rfl: 2    loratadine-pseudoephedrine (CLARITIN-D 24-HOUR)  mg per 24 hr tablet, Take 1 tablet by mouth daily (Patient not taking: Reported on 2024), Disp: 30 tablet, Rfl: 2    Current Allergies     Allergies as of 2024 - Reviewed 2024   Allergen Reaction Noted    Azithromycin  2013    Codeine Tachycardia 2013    Hydrocortisone Rash 2013            The following portions of the patient's history were reviewed and updated as appropriate: allergies, current medications, past family history, past medical history, past social history, past surgical history and  problem list.     Past Medical History:   Diagnosis Date    Lumbar radiculopathy     Last Assessed:5/7/13       Past Surgical History:   Procedure Laterality Date    FRACTURE SURGERY      Closed Fracture Treatment of Clavicular Fracture    TONSILLECTOMY AND ADENOIDECTOMY      TYMPANOPLASTY         Family History   Problem Relation Age of Onset    Asthma Sister     Hypothyroidism Mother     Osteoarthritis Mother     Other Father         Dyslipidemia    Mitral valve prolapse Father     Hypertension Father     Migraines Father     Cancer Paternal Grandfather          Medications have been verified.        Objective   /85   Pulse 68   Temp 97.9 °F (36.6 °C)   Resp 18   SpO2 98%        Physical Exam     Physical Exam  Vitals reviewed.   Constitutional:       General: He is not in acute distress.  Cardiovascular:      Rate and Rhythm: Normal rate and regular rhythm.      Pulses: Normal pulses.      Heart sounds: Normal heart sounds.   Pulmonary:      Effort: Pulmonary effort is normal. No respiratory distress.      Breath sounds: Normal breath sounds.   Skin:     Findings: Rash present.      Comments: Vesicular clustered rash to anterior chest with erythematous base no active drainage   Neurological:      Mental Status: He is alert.